# Patient Record
Sex: MALE | Race: WHITE | NOT HISPANIC OR LATINO | Employment: OTHER | URBAN - METROPOLITAN AREA
[De-identification: names, ages, dates, MRNs, and addresses within clinical notes are randomized per-mention and may not be internally consistent; named-entity substitution may affect disease eponyms.]

---

## 2017-01-05 ENCOUNTER — ALLSCRIPTS OFFICE VISIT (OUTPATIENT)
Dept: OTHER | Facility: OTHER | Age: 77
End: 2017-01-05

## 2017-01-31 ENCOUNTER — GENERIC CONVERSION - ENCOUNTER (OUTPATIENT)
Dept: OTHER | Facility: OTHER | Age: 77
End: 2017-01-31

## 2017-02-02 ENCOUNTER — GENERIC CONVERSION - ENCOUNTER (OUTPATIENT)
Dept: OTHER | Facility: OTHER | Age: 77
End: 2017-02-02

## 2017-02-09 ENCOUNTER — GENERIC CONVERSION - ENCOUNTER (OUTPATIENT)
Dept: OTHER | Facility: OTHER | Age: 77
End: 2017-02-09

## 2017-02-16 ENCOUNTER — GENERIC CONVERSION - ENCOUNTER (OUTPATIENT)
Dept: OTHER | Facility: OTHER | Age: 77
End: 2017-02-16

## 2017-03-03 ENCOUNTER — GENERIC CONVERSION - ENCOUNTER (OUTPATIENT)
Dept: OTHER | Facility: OTHER | Age: 77
End: 2017-03-03

## 2017-03-15 ENCOUNTER — GENERIC CONVERSION - ENCOUNTER (OUTPATIENT)
Dept: OTHER | Facility: OTHER | Age: 77
End: 2017-03-15

## 2017-04-06 ENCOUNTER — GENERIC CONVERSION - ENCOUNTER (OUTPATIENT)
Dept: OTHER | Facility: OTHER | Age: 77
End: 2017-04-06

## 2017-04-12 ENCOUNTER — GENERIC CONVERSION - ENCOUNTER (OUTPATIENT)
Dept: OTHER | Facility: OTHER | Age: 77
End: 2017-04-12

## 2017-04-18 ENCOUNTER — GENERIC CONVERSION - ENCOUNTER (OUTPATIENT)
Dept: OTHER | Facility: OTHER | Age: 77
End: 2017-04-18

## 2017-04-24 ENCOUNTER — GENERIC CONVERSION - ENCOUNTER (OUTPATIENT)
Dept: OTHER | Facility: OTHER | Age: 77
End: 2017-04-24

## 2017-04-25 ENCOUNTER — GENERIC CONVERSION - ENCOUNTER (OUTPATIENT)
Dept: OTHER | Facility: OTHER | Age: 77
End: 2017-04-25

## 2017-06-06 ENCOUNTER — TRANSCRIBE ORDERS (OUTPATIENT)
Dept: LAB | Facility: MEDICAL CENTER | Age: 77
End: 2017-06-06

## 2017-06-06 ENCOUNTER — APPOINTMENT (OUTPATIENT)
Dept: LAB | Facility: MEDICAL CENTER | Age: 77
End: 2017-06-06
Payer: MEDICARE

## 2017-06-06 ENCOUNTER — ALLSCRIPTS OFFICE VISIT (OUTPATIENT)
Dept: OTHER | Facility: OTHER | Age: 77
End: 2017-06-06

## 2017-06-06 DIAGNOSIS — E78.5 HYPERLIPIDEMIA: ICD-10-CM

## 2017-06-06 DIAGNOSIS — M48.07 SPINAL STENOSIS OF LUMBOSACRAL REGION: ICD-10-CM

## 2017-06-06 DIAGNOSIS — D64.9 ANEMIA: ICD-10-CM

## 2017-06-06 DIAGNOSIS — I10 ESSENTIAL (PRIMARY) HYPERTENSION: ICD-10-CM

## 2017-06-06 DIAGNOSIS — F32.9 MAJOR DEPRESSIVE DISORDER, SINGLE EPISODE: ICD-10-CM

## 2017-06-06 DIAGNOSIS — R41.3 OTHER AMNESIA: ICD-10-CM

## 2017-06-06 DIAGNOSIS — N39.0 URINARY TRACT INFECTION: ICD-10-CM

## 2017-06-06 LAB
ALBUMIN SERPL BCP-MCNC: 3.8 G/DL (ref 3.5–5)
ALP SERPL-CCNC: 62 U/L (ref 46–116)
ALT SERPL W P-5'-P-CCNC: 26 U/L (ref 12–78)
ANION GAP SERPL CALCULATED.3IONS-SCNC: 6 MMOL/L (ref 4–13)
AST SERPL W P-5'-P-CCNC: 14 U/L (ref 5–45)
BASOPHILS # BLD AUTO: 0.03 THOUSANDS/ΜL (ref 0–0.1)
BASOPHILS NFR BLD AUTO: 0 % (ref 0–1)
BILIRUB SERPL-MCNC: 0.57 MG/DL (ref 0.2–1)
BILIRUB UR QL STRIP: NEGATIVE
BUN SERPL-MCNC: 14 MG/DL (ref 5–25)
CALCIUM SERPL-MCNC: 8.5 MG/DL (ref 8.3–10.1)
CHLORIDE SERPL-SCNC: 103 MMOL/L (ref 100–108)
CHOLEST SERPL-MCNC: 178 MG/DL (ref 50–200)
CLARITY UR: CLEAR
CO2 SERPL-SCNC: 29 MMOL/L (ref 21–32)
COLOR UR: YELLOW
CREAT SERPL-MCNC: 0.72 MG/DL (ref 0.6–1.3)
EOSINOPHIL # BLD AUTO: 0.25 THOUSAND/ΜL (ref 0–0.61)
EOSINOPHIL NFR BLD AUTO: 2 % (ref 0–6)
ERYTHROCYTE [DISTWIDTH] IN BLOOD BY AUTOMATED COUNT: 13.1 % (ref 11.6–15.1)
GFR SERPL CREATININE-BSD FRML MDRD: >60 ML/MIN/1.73SQ M
GLUCOSE P FAST SERPL-MCNC: 80 MG/DL (ref 65–99)
GLUCOSE UR STRIP-MCNC: NEGATIVE MG/DL
HCT VFR BLD AUTO: 39.9 % (ref 36.5–49.3)
HDLC SERPL-MCNC: 59 MG/DL (ref 40–60)
HGB BLD-MCNC: 13.5 G/DL (ref 12–17)
HGB UR QL STRIP.AUTO: NEGATIVE
KETONES UR STRIP-MCNC: NEGATIVE MG/DL
LDLC SERPL CALC-MCNC: 94 MG/DL (ref 0–100)
LEUKOCYTE ESTERASE UR QL STRIP: NEGATIVE
LYMPHOCYTES # BLD AUTO: 3.83 THOUSANDS/ΜL (ref 0.6–4.47)
LYMPHOCYTES NFR BLD AUTO: 32 % (ref 14–44)
MCH RBC QN AUTO: 31.3 PG (ref 26.8–34.3)
MCHC RBC AUTO-ENTMCNC: 33.8 G/DL (ref 31.4–37.4)
MCV RBC AUTO: 93 FL (ref 82–98)
MONOCYTES # BLD AUTO: 0.81 THOUSAND/ΜL (ref 0.17–1.22)
MONOCYTES NFR BLD AUTO: 7 % (ref 4–12)
NEUTROPHILS # BLD AUTO: 6.84 THOUSANDS/ΜL (ref 1.85–7.62)
NEUTS SEG NFR BLD AUTO: 59 % (ref 43–75)
NITRITE UR QL STRIP: NEGATIVE
NRBC BLD AUTO-RTO: 0 /100 WBCS
PH UR STRIP.AUTO: 6 [PH] (ref 4.5–8)
PLATELET # BLD AUTO: 368 THOUSANDS/UL (ref 149–390)
PMV BLD AUTO: 11.2 FL (ref 8.9–12.7)
POTASSIUM SERPL-SCNC: 4.6 MMOL/L (ref 3.5–5.3)
PROT SERPL-MCNC: 7.1 G/DL (ref 6.4–8.2)
PROT UR STRIP-MCNC: NEGATIVE MG/DL
RBC # BLD AUTO: 4.31 MILLION/UL (ref 3.88–5.62)
SODIUM SERPL-SCNC: 138 MMOL/L (ref 136–145)
SP GR UR STRIP.AUTO: 1.02 (ref 1–1.03)
TRIGL SERPL-MCNC: 125 MG/DL
TSH SERPL DL<=0.05 MIU/L-ACNC: 1.65 UIU/ML (ref 0.36–3.74)
UROBILINOGEN UR QL STRIP.AUTO: 0.2 E.U./DL
WBC # BLD AUTO: 11.81 THOUSAND/UL (ref 4.31–10.16)

## 2017-06-06 PROCEDURE — 84443 ASSAY THYROID STIM HORMONE: CPT

## 2017-06-06 PROCEDURE — 80053 COMPREHEN METABOLIC PANEL: CPT

## 2017-06-06 PROCEDURE — 80061 LIPID PANEL: CPT

## 2017-06-06 PROCEDURE — 81003 URINALYSIS AUTO W/O SCOPE: CPT

## 2017-06-06 PROCEDURE — 85025 COMPLETE CBC W/AUTO DIFF WBC: CPT

## 2017-06-06 PROCEDURE — 36415 COLL VENOUS BLD VENIPUNCTURE: CPT

## 2017-06-07 ENCOUNTER — GENERIC CONVERSION - ENCOUNTER (OUTPATIENT)
Dept: OTHER | Facility: OTHER | Age: 77
End: 2017-06-07

## 2017-07-11 ENCOUNTER — ALLSCRIPTS OFFICE VISIT (OUTPATIENT)
Dept: OTHER | Facility: OTHER | Age: 77
End: 2017-07-11

## 2018-01-12 NOTE — MISCELLANEOUS
Assessment    1  Former smoker (V15 82) (A37 763)   2  Shortness of breath (786 05) (R06 02)    Plan   Benign prostatic hypertrophy without urinary obstruction, Dysuria    · Levofloxacin 500 MG Oral Tablet   Rx By: Sharron Sharp; Dispense: 10 Days ; #:10 Tablet; Refill: 0; For: Benign prostatic hypertrophy without urinary obstruction, Dysuria; YENIFER = N; Sent To: [facility] 8095  Coronary artery disease    · Losartan Potassium 50 MG Oral Tablet   Rx By: Sharron Sharp; Dispense: 0 Days ; #:180 Tablet; Refill: 3; For: Coronary artery disease; YENIFER = N; Record  Shortness of breath    · Symbicort 160-4 5 MCG/ACT Inhalation Aerosol; INHALE 2 PUFFS TWICE DAILY  RINSE MOUTH AFTER USE   Rx By: Sharron Sharp; Dispense: 30 Days ; #:1 X 10 2 GM Inhaler; Refill: 5; For: Shortness of breath; YENIFER = N; Record    Follow-up visit in 3 months Evaluation and Treatment  Follow-up  Status: Hold For - Scheduling  Requested for: 81ZSD4227  Ordered; For: Shortness of breath;  Ordered By: Sharron Sharp  Performed:   Due: 94IRT1292     Discussion/Summary  Discussion Summary:   Sample of [facility] given  F/U with pulmonology and hematology  Follow-up here in 1-2 months or when necessary  Medication SE Review and Pt Understands Tx: Possible side effects of new medications were reviewed with the patient/guardian today  The treatment plan was reviewed with the patient/guardian  The patient/guardian understands and agrees with the treatment plan      Chief Complaint  Chief Complaint Free Text Note Form: DOUGLAS      History of Present Illness  TCM Communication [facility]: The patient is being contacted for follow-up after hospitalization and Munir Cerda called and scheduled patient for AKIRA ODOM appt on 5/23/16 at 1pm  Patient has no pending appts scheduled  Hospital records were reviewed  He was hospitalized at [address] [place] [address]  The date of admission: 05/15/2016, date of discharge: 05/16/2016  Diagnosis: SOB  He was discharged to home  Medications were not reviewed today  He scheduled a follow up appointment  Follow-up appointments with other specialists: Laurita Summers/Pulmonology and Lazarus Slim Banning/Cardiology both to be scheduled appt ASAP for visit in 3 days  The patient is currently asymptomatic  Counseling was provided to the patient  Communication performed and completed by Alecia Engel   HPI: Patient was admitted for shortness of breath  Patient was discharged on prednisone and feeling better  Patient has been on inhalers like Symbicort, but has not used them in a while  Review of Systems  Complete-Male:   Constitutional: No fever or chills, feels well, no tiredness, no recent weight gain or weight loss  Cardiovascular: No complaints of slow heart rate, no fast heart rate, no chest pain, no palpitations, no leg claudication, no lower extremity  Respiratory: as noted in HPI  Gastrointestinal: No complaints of abdominal pain, no constipation, no nausea or vomiting, no diarrhea or bloody stools  Genitourinary: No complaints of dysuria, no incontinence, no hesitancy, no nocturia, no genital lesion, no testicular pain  Active Problems    1  Abnormal fasting glucose (790 29) (R73 09)   2  Acute sinusitis (461 9) (J01 90)   3  Anemia (285 9) (D64 9)   4  Arthritis (716 90) (M19 90)   5  Benign prostatic hypertrophy without urinary obstruction (600 00) (N40 0)   6  Blurry vision (368 8) (H53 8)   7  Coronary artery disease (414 00) (I25 10)   8  Depression (311) (F32 9)   9  Dyspepsia (536 8) (K30)   10  Dysuria (788 1) (R30 0)   11  Encounter for prostate cancer screening (V76 44) (Z12 5)   12  Fatigue (780 79) (R53 83)   13  Gastric ulcer (531 90) (K25 9)   14  Hyperlipidemia (272 4) (E78 5)   15  Hypertension (401 9) (I10)   16  Insomnia (780 52) (G47 00)   17  Muscle fatigue (729 89) (M62 89)   18  Need for prophylactic vaccination and inoculation against influenza (V04 81) (Z23)   19   Normal pressure hydrocephalus (331 5) (G91 2)   20  Open Wound Of The Foot (892 0)   21  Pain in joint of right shoulder region (719 41) (M25 511)   22  Peripheral neuropathy (356 9) (G62 9)   23  Sciatica (724 3) (M54 30)   24  Screening for neurological condition (V80 09) (Z13 89)   25  Urinary tract infection (599 0) (N39 0)    Past Medical History    1  Acute upper respiratory infection (465 9) (J06 9)   2  Coronary artery disease (414 00) (I25 10)   3  Hernia (553 9) (K46 9)    Surgical History    1  History of Appendectomy   2  History of Complete Colonoscopy   3  History of Diagnostic Esophagogastroduodenoscopy   4  History of Knee Replacement   5  History of Needle Biopsy Of Prostate   6  History of Tonsillectomy  Surgical History Reviewed: The surgical history was reviewed and updated today  Family History  Sister    1  Family history of Malignant neoplasm of breast, unspecified laterality  Brother    2  Family history of leukemia (V16 6) (Z80 6)  Family History    3  Family history of Diabetes Mellitus (250 00)   4  Family history of hypertension (V17 49) (Z82 49)  Family History Reviewed: The family history was reviewed and updated today  Social History     · Being A Social Drinker  Social History Reviewed: The social history was reviewed and updated today  The social history was reviewed and is unchanged  Former smoker (V15 82) (C57 838)             Current Meds   1  Aspirin 325 MG Oral Tablet; ONE TAB DAILY Recorded   2  Cialis 5 MG Oral Tablet; TAKE 1 TABLET Daily for BPH; Therapy: 65Dba7175 to (Evaluate:21Jun2014); Last Rx:00Gax5375 Ordered   3  Diclofenac Sodium  MG Oral Tablet Extended Release 24 Hour; TAKE 1 TABLET   BY MOUTH TWICE A DAY AS NEEDED FOR PAIN;   Therapy: 94ACU9804 to (Evaluate:22Jan2015)  Requested for: 25PXQ5880; Last   Rx:27Jan2014 Ordered   4  Fish Oil 1000 MG Oral Capsule; TAKE 1 CAPSULE DAILY; Therapy: 41PCH2435 to Recorded   5  Levofloxacin 500 MG Oral Tablet;  Take 1 tablet daily; Therapy: 96TDA0394 to (Tim Smith)  Requested for: 87WPU5999; Last   Rx:18Mar2016 Ordered   6  Losartan Potassium 50 MG Oral Tablet; TAKE 1 TABLET TWICE DAILY; Therapy: 03XIV8953 to (Evaluate:24Nov2014); Last Rx:13Mxl1195 Ordered   7  Nitrostat 0 4 MG Sublingual Tablet Sublingual; PLACE 1 TABLET UNDER THE TONGUE   EVERY 5 MINUTES FOR UP TO 3 DOSES AS NEEDED FOR CHEST PAIN  CALL   911 IF PAIN PERSISTS; Therapy: 39EXH7357 to (Florencia Shea)  Requested for: 76VVV9483; Last   Rx:09Fbx0643 Ordered   8  Oxycodone-Acetaminophen 5-325 MG Oral Tablet; TAKE 1 TABLET 4 times daily PRN   pain; Therapy: 87KJO8425 to (Evaluate:74Ipn8236)  Requested for: 71KOB2564; Last   Rx:76Buu1291 Ordered   9  Tamsulosin HCl - 0 4 MG Oral Capsule; TAKE ONE CAPSULE AT BEDTIME; Therapy: 30PXJ0883 to (Evaluate:70Ipx6234)  Requested for: 82MVF9545; Last   Rx:18Mar2016 Ordered   10  Zolpidem Tartrate 10 MG Oral Tablet; Take one (1) tablet(s) by mouth ATBEDTIME as    needed for insomnia; Therapy: 88AAF7554 to (Evaluate:13Apr2015)  Requested for: 10NJV6688; Last    Rx:48Ior6048 Ordered  Medication List Reviewed: The medication list was reviewed and updated today  Allergies    1  No Known Drug Allergies    Vitals  Signs [Data Includes: Current Encounter]   Recorded: 23DMA0925 30:19CE   Systolic: 242  Diastolic: 64  Height: 6 ft 1 in  Weight: 199 lb 8 0 oz  BMI Calculated: 26 32  BSA Calculated: 2 14    Physical Exam    Constitutional   General appearance: No acute distress, well appearing and well nourished  Pulmonary   Respiratory effort: No increased work of breathing or signs of respiratory distress  Auscultation of lungs: Clear to auscultation, equal breath sounds bilaterally, no wheezes, no rales, no rhonci  Cardiovascular   Auscultation of heart: Normal rate and rhythm, normal S1 and S2, without murmurs      Examination of extremities for edema and/or varicosities: Normal     Psychiatric Orientation to person, place and time: Normal     Mood and affect: Normal          Signatures   Electronically signed by : Trent Dong, ; May 18 2016  1:35PM EST                       (Author)    Electronically signed by : Jah Ellis DO; May 23 2016  4:44PM EST                       (Author)

## 2018-01-14 VITALS
HEIGHT: 73 IN | BODY MASS INDEX: 24.78 KG/M2 | WEIGHT: 187 LBS | SYSTOLIC BLOOD PRESSURE: 130 MMHG | DIASTOLIC BLOOD PRESSURE: 70 MMHG

## 2018-01-14 VITALS
SYSTOLIC BLOOD PRESSURE: 112 MMHG | HEIGHT: 73 IN | BODY MASS INDEX: 25.2 KG/M2 | DIASTOLIC BLOOD PRESSURE: 70 MMHG | WEIGHT: 190.13 LBS

## 2018-01-14 VITALS
WEIGHT: 201.5 LBS | BODY MASS INDEX: 26.71 KG/M2 | DIASTOLIC BLOOD PRESSURE: 78 MMHG | SYSTOLIC BLOOD PRESSURE: 122 MMHG | HEIGHT: 73 IN

## 2018-01-14 NOTE — MISCELLANEOUS
Chief Complaint  Chief Complaint Free Text Note Form: Patient was in no show for his DOUGLAS appointment      History of Present Illness  TCM Communication [facility]: The patient is being contacted for follow-up after hospitalization and Nurse at [facility] called to schedule DOUGLAS appt on 10/17/16 at 1:15 pm  He was hospitalized at [facility]  The date of admission: 10/11/16, date of discharge: 10/12/16  Diagnosis: Atrial Fibrillation  He was discharged to home  Medications were not reviewed today  He scheduled a follow up appointment  Eryn Regalado spoke with nurse Counseling was provided to  Nurse from [facility]  Communication performed and completed by Valerie Pina      Active Problems    1  Abnormal fasting glucose (790 29) (R73 01)   2  Acute sinusitis (461 9) (J01 90)   3  Anemia (285 9) (D64 9)   4  Arthritis (716 90) (M19 90)   5  Benign prostatic hypertrophy without urinary obstruction (600 00) (N40 0)   6  Blurry vision (368 8) (H53 8)   7  Coronary artery disease (414 00) (I25 10)   8  Depression (311) (F32 9)   9  Dyspepsia (536 8) (K30)   10  Dysuria (788 1) (R30 0)   11  Encounter for prostate cancer screening (V76 44) (Z12 5)   12  Fatigue (780 79) (R53 83)   13  Gastric ulcer (531 90) (K25 9)   14  Hyperlipidemia (272 4) (E78 5)   15  Hypertension (401 9) (I10)   16  Insomnia (780 52) (G47 00)   17  Muscle fatigue (729 89) (M62 89)   18  Need for prophylactic vaccination and inoculation against influenza (V04 81) (Z23)   19  Normal pressure hydrocephalus (331 5) (G91 2)   20  Open Wound Of The Foot (892 0)   21  Pain in joint of right shoulder region (719 41) (M25 511)   22  Peripheral neuropathy (356 9) (G62 9)   23  Sciatica (724 3) (M54 30)   24  Screening for neurological condition (V80 09) (Z13 89)   25  Shortness of breath (786 05) (R06 02)   26  Urinary tract infection (599 0) (N39 0)    Past Medical History    1  Acute upper respiratory infection (465 9) (J06 9)   2  Coronary artery disease (414 00) (I25 10)   3  Hernia (553 9) (K46 9)    Surgical History    1  History of Appendectomy   2  History of Complete Colonoscopy   3  History of Diagnostic Esophagogastroduodenoscopy   4  History of Knee Replacement   5  History of Needle Biopsy Of Prostate   6  History of Tonsillectomy    Family History  Sister    1  Family history of Malignant neoplasm of breast, unspecified laterality  Brother    2  Family history of leukemia (V16 6) (Z80 6)  Family History    3  Family history of Diabetes Mellitus (250 00)   4  Family history of hypertension (V17 49) (Z82 49)    Social History    · Being A Social Drinker   · Former smoker (E83 36) (K03 795)    Current Meds   1  Aspirin 325 MG Oral Tablet; ONE TAB DAILY Recorded   2  Cialis 5 MG Oral Tablet; TAKE 1 TABLET Daily for BPH; Therapy: 62Pwk0458 to (Evaluate:21Jun2014); Last Rx:17Ixe4163 Ordered   3  Diclofenac Sodium  MG Oral Tablet Extended Release 24 Hour; TAKE 1 TABLET   BY MOUTH TWICE A DAY AS NEEDED FOR PAIN;   Therapy: 23ZSI4382 to (Evaluate:22Jan2015)  Requested for: 20VIG2606; Last   Rx:27Jan2014 Ordered   4  Fish Oil 1000 MG Oral Capsule; TAKE 1 CAPSULE DAILY; Therapy: 85DAQ1146 to Recorded   5  Nitrostat 0 4 MG Sublingual Tablet Sublingual; PLACE 1 TABLET UNDER THE TONGUE   EVERY 5 MINUTES FOR UP TO 3 DOSES AS NEEDED FOR CHEST PAIN  CALL   911 IF PAIN PERSISTS; Therapy: 19RAH0223 to ([phone])  Requested for: 80NRV8996; Last   Rx:40Lhh9934 Ordered   6  Oxycodone-Acetaminophen 5-325 MG Oral Tablet; TAKE 1 TABLET 4 times daily PRN   pain; Therapy: 01JOF8734 to (Evaluate:13Oct2016)  Requested for: 93JEV9776; Last   Rx:66Izq5521 Ordered   7  Symbicort 160-4 5 MCG/ACT Inhalation Aerosol; INHALE 2 PUFFS TWICE DAILY  RINSE   MOUTH AFTER USE; Therapy: 29RNA5338 to (Evaluate:19Nov2016); Last Rx:49Wns8689 Ordered   8  Tamsulosin HCl - 0 4 MG Oral Capsule; TAKE ONE CAPSULE AT BEDTIME;    Therapy: 62ITI0665 to (Evaluate:52Xua6693)  Requested for: 11TAC3020; Last   Rx:18Mar2016 Ordered   9  Zolpidem Tartrate 10 MG Oral Tablet; Take one (1) tablet(s) by mouth ATBEDTIME as   needed for insomnia; Therapy: 25PQI7651 to (Evaluate:13Apr2015)  Requested for: 25JLR3804; Last   Rx:70Sxk2378 Ordered    Allergies    1   No Known Drug Allergies    Signatures   Electronically signed by : Holland Barton, ; Oct 14 2016  4:20PM EST                       (Author)    Electronically signed by : Enzo Bailey DO; Oct 18 2016  3:49PM EST                       (Author)

## 2018-01-17 NOTE — RESULT NOTES
Verified Results  (1) TSH 01DCU8975 10:34AM Michael Days Order Number: [id number]     Test Name Result Flag Reference   TSH 1 650 uIU/mL  0 358-3 740   Patients undergoing fluorescein dye angiography may retain small amounts of fluorescein in the body for 48-72 hours post procedure  Samples containing fluorescein can produce falsely depressed TSH values  If the patient had this procedure,a specimen should be resubmitted post fluorescein clearance  (1) URINALYSIS w URINE C/S REFLEX (will reflex a microscopy if leukocytes, occult blood, or nitrites are not within normal limits) 99FBF4122 10:34AM Rod Ferrariksenia    Order Number: [id number]     Test Name Result Flag Reference   COLOR Yellow     CLARITY Clear     PH UA 6 0  4 5-8 0   LEUKOCYTE ESTERASE UA Negative  Negative   NITRITE UA Negative  Negative   PROTEIN UA Negative mg/dl  Negative   GLUCOSE UA Negative mg/dl  Negative   KETONES UA Negative mg/dl  Negative   UROBILINOGEN UA 0 2 E U /dl  0 2, 1 0 E U /dl   BILIRUBIN UA Negative  Negative   BLOOD UA Negative  Negative   SPECIFIC GRAVITY UA 1 017  1 003-1 030     (1) CBC/PLT/DIFF 68IUR8720 10:34AM Pamelapromise Elizabeth    Order Number: [id number]     Test Name Result Flag Reference   WBC COUNT 11 81 Thousand/uL H 4 31-10 16   RBC COUNT 4 31 Million/uL  3 88-5 62   HEMOGLOBIN 13 5 g/dL  12 0-17 0   HEMATOCRIT 39 9 %  36 5-49 3   MCV 93 fL  82-98   MCH 31 3 pg  26 8-34 3   MCHC 33 8 g/dL  31 4-37 4   RDW 13 1 %  11 6-15 1   MPV 11 2 fL  8 9-12 7   PLATELET COUNT 673 Thousands/uL  149-390   nRBC AUTOMATED 0 /100 WBCs     NEUTROPHILS RELATIVE PERCENT 59 %  43-75   LYMPHOCYTES RELATIVE PERCENT 32 %  14-44   MONOCYTES RELATIVE PERCENT 7 %  4-12   EOSINOPHILS RELATIVE PERCENT 2 %  0-6   BASOPHILS RELATIVE PERCENT 0 %  0-1   NEUTROPHILS ABSOLUTE COUNT 6 84 Thousands/? ??L  1 85-7 62   LYMPHOCYTES ABSOLUTE COUNT 3 83 Thousands/? ??L  0 60-4 47   MONOCYTES ABSOLUTE COUNT 0 81 Thousand/? ??L  0 17-1 22   EOSINOPHILS ABSOLUTE COUNT 0 25 Thousand/? ??L  0 00-0 61   BASOPHILS ABSOLUTE COUNT 0 03 Thousands/? ??L  0 00-0 10     (1) COMPREHENSIVE METABOLIC PANEL 10YNW7254 45:56EN Marilu Lopez   TW Order Number: [id number]     Test Name Result Flag Reference   SODIUM 138 mmol/L  136-145   POTASSIUM 4 6 mmol/L  3 5-5 3   CHLORIDE 103 mmol/L  100-108   CARBON DIOXIDE 29 mmol/L  21-32   ANION GAP (CALC) 6 mmol/L  4-13   BLOOD UREA NITROGEN 14 mg/dL  5-25   CREATININE 0 72 mg/dL  0 60-1 30   Standardized to IDMS reference method   CALCIUM 8 5 mg/dL  8 3-10 1   BILI, TOTAL 0 57 mg/dL  0 20-1 00   ALK PHOSPHATAS 62 U/L     ALT (SGPT) 26 U/L  12-78   AST(SGOT) 14 U/L  5-45   ALBUMIN 3 8 g/dL  3 5-5 0   TOTAL PROTEIN 7 1 g/dL  6 4-8 2   eGFR Non-African American      >60 0 ml/min/1 73sq m   [facility] [employer] Disease Education Program recommendations are as follows:  GFR calculation is accurate only with a steady state creatinine  Chronic Kidney disease less than 60 ml/min/1 73 sq  meters  Kidney failure less than 15 ml/min/1 73 sq  meters     GLUCOSE FASTING 80 mg/dL  65-99     (1) LIPID PANEL FASTING W DIRECT LDL REFLEX 05FQC3960 10:34AM Ioana Jackman Order Number: [id number]     Test Name Result Flag Reference   CHOLESTEROL 178 mg/dL     LDL CHOLESTEROL CALCULATED 94 mg/dL  0-100   Triglyceride:         Normal              <150 mg/dl       Borderline High    150-199 mg/dl       High               200-499 mg/dl       Very High          >499 mg/dl  Cholesterol:         Desirable        <200 mg/dl      Borderline High  200-239 mg/dl      High             >239 mg/dl  HDL Cholesterol:        High    >59 mg/dL      Low     <41 mg/dL  LDL Cholesterol:        Optimal          <100 mg/dl        Near Optimal     100-129 mg/dl        Above Optimal          Borderline High   130-159 mg/dl          High              160-189 mg/dl          Very High        >189 mg/dl  LDL CALCULATED:    This screening LDL is a calculated result  It does not have the accuracy of the Direct Measured LDL in the monitoring of patients with hyperlipidemia and/or statin therapy  Direct Measure LDL (HEU151) must be ordered separately in these patients  TRIGLYCERIDES 125 mg/dL  <=150   Specimen collection should occur prior to N-Acetylcysteine or Metamizole administration due to the potential for falsely depressed results  HDL,DIRECT 59 mg/dL  40-60   Specimen collection should occur prior to Metamizole administration due to the potential for falsely depressed results  Plan  Anemia    · (1) CBC/PLT/DIFF; Status:Active;  Requested QZA:[id number];

## 2018-03-05 ENCOUNTER — TELEPHONE (OUTPATIENT)
Dept: FAMILY MEDICINE CLINIC | Facility: CLINIC | Age: 78
End: 2018-03-05

## 2018-12-13 DIAGNOSIS — F32.A DEPRESSION, UNSPECIFIED DEPRESSION TYPE: Primary | ICD-10-CM

## 2018-12-13 DIAGNOSIS — M48.07 LUMBOSACRAL SPINAL STENOSIS: ICD-10-CM

## 2018-12-13 RX ORDER — SERTRALINE HYDROCHLORIDE 100 MG/1
TABLET, FILM COATED ORAL
Qty: 90 TABLET | Refills: 3 | Status: SHIPPED | OUTPATIENT
Start: 2018-12-13 | End: 2020-02-10 | Stop reason: SDUPTHER

## 2019-02-11 ENCOUNTER — TRANSITIONAL CARE MANAGEMENT (OUTPATIENT)
Dept: FAMILY MEDICINE CLINIC | Facility: CLINIC | Age: 79
End: 2019-02-11

## 2019-02-13 ENCOUNTER — TELEPHONE (OUTPATIENT)
Dept: FAMILY MEDICINE CLINIC | Facility: CLINIC | Age: 79
End: 2019-02-13

## 2019-02-13 NOTE — TELEPHONE ENCOUNTER
PT WA D/C FROM HOSPITAL, THEY ARE TO GO IN FOR PT/OT  VERIFIED YOU ARE THE PCP, THEY WILL SEND ORDER FOR YOU TO SIGN  FLORINDA CALLED BACK FROM [facility], SHE WAS UNABLE TO MAKE IT TO THERE HOME DUE TO THE WEATHER CONDITIONS ON THERE ROAD

## 2019-07-01 ENCOUNTER — TRANSITIONAL CARE MANAGEMENT (OUTPATIENT)
Dept: FAMILY MEDICINE CLINIC | Facility: CLINIC | Age: 79
End: 2019-07-01

## 2019-07-03 ENCOUNTER — TELEPHONE (OUTPATIENT)
Dept: FAMILY MEDICINE CLINIC | Facility: CLINIC | Age: 79
End: 2019-07-03

## 2019-08-12 ENCOUNTER — TELEPHONE (OUTPATIENT)
Dept: FAMILY MEDICINE CLINIC | Facility: CLINIC | Age: 79
End: 2019-08-12

## 2019-08-12 NOTE — TELEPHONE ENCOUNTER
The only blood thinner I have him on in our chart is aspirin    He may stop at 7 days prior to surgery, and resume as soon as the surgeon says he can resume it

## 2019-10-07 ENCOUNTER — TELEPHONE (OUTPATIENT)
Dept: FAMILY MEDICINE CLINIC | Facility: CLINIC | Age: 79
End: 2019-10-07

## 2019-10-07 NOTE — TELEPHONE ENCOUNTER
PT BEING RELEASED FROM [facility] ON THE 10TH WITH SKILLED, OT, PT  WOULD YOU BE WILLING TO SIGN ORDERS?

## 2019-10-11 ENCOUNTER — TRANSITIONAL CARE MANAGEMENT (OUTPATIENT)
Dept: FAMILY MEDICINE CLINIC | Facility: CLINIC | Age: 79
End: 2019-10-11

## 2019-10-11 ENCOUNTER — TELEPHONE (OUTPATIENT)
Dept: FAMILY MEDICINE CLINIC | Facility: CLINIC | Age: 79
End: 2019-10-11

## 2019-10-11 NOTE — TELEPHONE ENCOUNTER
Besides the metoprolol, is he on any other blood pressure medications?   What dose of metoprolol is he on?

## 2019-10-11 NOTE — TELEPHONE ENCOUNTER
WENT IN TO START CARE TODAY  PT BP WAS HIGH, 160/100 MANUALLY SEATED  STANDING IT /72  SHE TOOK IT ELECTRONICALLY AND IT /95  HE DID TAKE HIS ASPIRIN AND METOPROLOL  COMPLAINING OF BAD HEADACHE, PAIN LEVEL OF 7/10  WHEN HE WAS IN THE NURSING HOME IT WAS RUNNING LOW 70/40   WHAT TO DO?

## 2019-10-11 NOTE — TELEPHONE ENCOUNTER
THE METOPROLOL IS 12 5 MG BID, HE IS ALSO ON RANEXA 500 MG BID  WHEN HE WAS IN THE NURSING HOME HE WAS GIVEN FLORINEX  1 MG FOR A SYSTOLIC READING BELOW 697  HE DOES NOT HAVE THAT NOW

## 2019-10-11 NOTE — TELEPHONE ENCOUNTER
He may take another metoprolol, taking it 3 times a day if his blood pressure remains high    Continue to monitor and call us with results

## 2019-10-15 ENCOUNTER — TELEPHONE (OUTPATIENT)
Dept: FAMILY MEDICINE CLINIC | Facility: CLINIC | Age: 79
End: 2019-10-15

## 2019-10-15 DIAGNOSIS — R06.00 DYSPNEA ON EXERTION: Primary | ICD-10-CM

## 2019-10-15 DIAGNOSIS — R06.2 WHEEZING: ICD-10-CM

## 2019-10-15 PROBLEM — A04.72 C. DIFFICILE COLITIS: Status: ACTIVE | Noted: 2019-10-07

## 2019-10-15 NOTE — TELEPHONE ENCOUNTER
PT [address] [facility], SHE WENT IN TO START CARE AGAIN  PT IS HAVING SOME WHEEZING IN BILATERAL LUNG BASE, DYSPNEA WITH EXERTION  WOULD LIKE RX FOR A NEBULIZER FAXED TO IN HOME OXYGEN [phone]

## 2019-10-17 ENCOUNTER — OFFICE VISIT (OUTPATIENT)
Dept: FAMILY MEDICINE CLINIC | Facility: CLINIC | Age: 79
End: 2019-10-17
Payer: MEDICARE

## 2019-10-17 VITALS
BODY MASS INDEX: 23.3 KG/M2 | HEIGHT: 71 IN | DIASTOLIC BLOOD PRESSURE: 80 MMHG | WEIGHT: 166.4 LBS | SYSTOLIC BLOOD PRESSURE: 128 MMHG

## 2019-10-17 DIAGNOSIS — I10 ESSENTIAL HYPERTENSION: ICD-10-CM

## 2019-10-17 DIAGNOSIS — A04.72 C. DIFFICILE COLITIS: Primary | ICD-10-CM

## 2019-10-17 DIAGNOSIS — H91.93 DECREASED HEARING OF BOTH EARS: ICD-10-CM

## 2019-10-17 DIAGNOSIS — Z23 ENCOUNTER FOR IMMUNIZATION: ICD-10-CM

## 2019-10-17 PROCEDURE — 90732 PPSV23 VACC 2 YRS+ SUBQ/IM: CPT | Performed by: FAMILY MEDICINE

## 2019-10-17 PROCEDURE — G0009 ADMIN PNEUMOCOCCAL VACCINE: HCPCS | Performed by: FAMILY MEDICINE

## 2019-10-17 PROCEDURE — 99214 OFFICE O/P EST MOD 30 MIN: CPT | Performed by: FAMILY MEDICINE

## 2019-10-17 RX ORDER — PRAMIPEXOLE DIHYDROCHLORIDE 0.12 MG/1
0.12 TABLET ORAL 3 TIMES DAILY
COMMUNITY
End: 2020-02-10

## 2019-10-17 RX ORDER — ASPIRIN 81 MG/1
81 TABLET ORAL DAILY
COMMUNITY

## 2019-10-17 RX ORDER — RANOLAZINE 500 MG/1
500 TABLET, EXTENDED RELEASE ORAL 2 TIMES DAILY
COMMUNITY
End: 2020-02-10

## 2019-10-17 RX ORDER — FLUDROCORTISONE ACETATE 0.1 MG/1
0.1 TABLET ORAL DAILY
COMMUNITY
End: 2020-02-10 | Stop reason: SDUPTHER

## 2019-10-17 RX ORDER — NITROGLYCERIN 0.4 MG/1
0.4 TABLET SUBLINGUAL
COMMUNITY

## 2019-10-17 RX ORDER — ATORVASTATIN CALCIUM 40 MG/1
40 TABLET, FILM COATED ORAL DAILY
COMMUNITY
End: 2020-02-10

## 2019-10-17 RX ORDER — VANCOMYCIN HYDROCHLORIDE 125 MG/1
125 CAPSULE ORAL 4 TIMES DAILY
COMMUNITY
End: 2020-02-10

## 2019-10-17 RX ORDER — SODIUM CHLORIDE 1000 MG
1 TABLET, SOLUBLE MISCELLANEOUS 3 TIMES DAILY
COMMUNITY
End: 2020-02-10

## 2019-10-17 NOTE — PROGRESS NOTES
Transition of Care  Follow-up After Hospitalization    Colletta Notch 67 y o  male   Date:  10/17/2019    TCM Call (since 9/16/2019)     Date and time call was made  10/11/2019 10:21 AM    Patient was hospitialized at  [facility] (comment)    [address] [place] [address] unknown admission date and transferred to [facility] in [place] on 9/5/19    Date of Admission  09/05/19    Date of discharge  10/10/19    Diagnosis  Fecal transplant    Disposition  Home health services; Home      TCM Call (since 9/16/2019)     Scheduled for follow up? Yes <img src='C:FILES (X86)    Are you recieving home care services  Yes <img src='C:FILES (X86)    Counseling  -- <img src='C:FILES [facility])        Hospital records were reviewed  Medications upon discharge reviewed/updated  Discharge Disposition:    Follow up visits with other specialists:       Assessment and Plan:  Patient will continue the vancomycin for now until he hears from the GI doctor whether they want to do another fecal transplant for him  Pneumonia 23 given today  Patient already had his flu shot this year  We will see him back in the springtime or p r n  Tristan Caceres was seen today for follow-up  Diagnoses and all orders for this visit:    C  difficile colitis    Essential hypertension    Decreased hearing of both ears    Encounter for immunization  -     PNEUMOCOCCAL POLYSACCHARIDE VACCINE 23-VALENT =>3YO SQ IM            HPI:  Patient here today for DOUGLAS  Patient denies any chest pain or shortness of breath  Patient was admitted for hypertension and C diff  Patient now on vancomycin protocol  Patient has been treated for C diff it least 3 times beforehand  Even had a fecal transplant  Unfortunately, patient had to go back on antibiotics a month or so ago for an infected elbow, which gave him the C diff again  Patient is having a side effect from the vancomycin with decreased hearing  He denies any fever chills        ROS: Review of Systems Constitutional: Negative  HENT: Positive for hearing loss  Respiratory: Negative  Cardiovascular: Negative  Gastrointestinal:        As per HPI   Genitourinary: Negative  No past medical history on file      Past Surgical History:   Procedure Laterality Date    CARDIAC SURGERY      COLONOSCOPY WITH FECAL TRANSPLANT      SHUNT TAP  08/25/2019       Social History     Socioeconomic History    Marital status: /Civil Union     Spouse name: None    Number of children: None    Years of education: None    Highest education level: None   Occupational History    None   Social Needs    Financial resource strain: None    Food insecurity:     Worry: None     Inability: None    Transportation needs:     Medical: None     Non-medical: None   Tobacco Use    Smoking status: Never Smoker    Smokeless tobacco: Never Used   Substance and Sexual Activity    Alcohol use: Not Currently    Drug use: Never    Sexual activity: Not Currently   Lifestyle    Physical activity:     Days per week: None     Minutes per session: None    Stress: None   Relationships    Social connections:     Talks on phone: None     Gets together: None     Attends Muslim service: None     Active member of club or organization: None     Attends meetings of clubs or organizations: None     Relationship status: None    Intimate partner violence:     Fear of current or ex partner: None     Emotionally abused: None     Physically abused: None     Forced sexual activity: None   Other Topics Concern    None   Social History Narrative    None       Family History   Family history unknown: Yes       No Known Allergies      Current Outpatient Medications:     aspirin (ECOTRIN LOW STRENGTH) 81 mg EC tablet, Take 81 mg by mouth daily, Disp: , Rfl:     atorvastatin (LIPITOR) 40 mg tablet, Take 40 mg by mouth daily, Disp: , Rfl:     carbidopa-levodopa (SINEMET)  mg per tablet, Take 1 tablet by mouth 2 (two) times a day, Disp: , Rfl:     Diclofenac Sodium  MG 24 hr tablet, TAKE 1 TABLET BY MOUTH  TWICE A DAY AS NEEDED FOR  PAIN, Disp: 180 tablet, Rfl: 0    fludrocortisone (FLORINEF) 0 1 mg tablet, Take 0 1 mg by mouth daily, Disp: , Rfl:     metoprolol tartrate (LOPRESSOR) 25 mg tablet, Take 25 mg by mouth every 12 (twelve) hours, Disp: , Rfl:     nitroglycerin (NITROSTAT) 0 4 mg SL tablet, Place 0 4 mg under the tongue every 5 (five) minutes as needed for chest pain, Disp: , Rfl:     pramipexole (MIRAPEX) 0 125 mg tablet, Take 0 125 mg by mouth 3 (three) times a day, Disp: , Rfl:     ranolazine (RANEXA) 500 mg 12 hr tablet, Take 500 mg by mouth 2 (two) times a day, Disp: , Rfl:     sertraline (ZOLOFT) 100 mg tablet, TAKE 1 TABLET BY MOUTH  DAILY, Disp: 90 tablet, Rfl: 3    sodium chloride 1 g tablet, Take 1 g by mouth 3 (three) times a day, Disp: , Rfl:     vancomycin (VANCOCIN) 125 MG capsule, Take 125 mg by mouth 4 (four) times a day, Disp: , Rfl:       Physical Exam:  /80   Ht 5' 11" (1 803 m)   Wt 75 5 kg (166 lb 6 4 oz)   BMI 23 21 kg/m²     Physical Exam   Constitutional: He is oriented to person, place, and time  He appears well-developed and well-nourished  No distress  HENT:   Head: Normocephalic and atraumatic  Eyes: Conjunctivae are normal    Cardiovascular: Normal rate, regular rhythm and normal heart sounds  Exam reveals no gallop and no friction rub  No murmur heard  Pulmonary/Chest: Effort normal and breath sounds normal  No respiratory distress  He has no wheezes  He has no rales  Musculoskeletal: He exhibits no edema  Neurological: He is alert and oriented to person, place, and time  Skin: He is not diaphoretic  Psychiatric: He has a normal mood and affect  His behavior is normal  Judgment and thought content normal    Vitals reviewed            Labs:  Lab Results   Component Value Date    WBC 11 81 (H) 06/06/2017    HGB 13 5 06/06/2017    HCT 39 9 06/06/2017    MCV 93 06/06/2017     06/06/2017     Lab Results   Component Value Date     03/10/2014    K 4 6 06/06/2017     06/06/2017    CO2 29 06/06/2017    ANIONGAP 13 03/10/2014    BUN 14 06/06/2017    CREATININE 0 72 06/06/2017    GLUCOSE 103 03/10/2014    GLUF 80 06/06/2017    CALCIUM 8 5 06/06/2017    AST 14 06/06/2017    ALT 26 06/06/2017    ALKPHOS 62 06/06/2017    EGFR >60 0 06/06/2017

## 2019-10-21 ENCOUNTER — TELEPHONE (OUTPATIENT)
Dept: FAMILY MEDICINE CLINIC | Facility: CLINIC | Age: 79
End: 2019-10-21

## 2019-10-21 DIAGNOSIS — R05.8 PRODUCTIVE COUGH: Primary | ICD-10-CM

## 2019-10-21 NOTE — TELEPHONE ENCOUNTER
CAN YOU ORDER A MOBILE X-RAY FOR PT  THEY WILL SET IT UP WITH THE MOBILE UNIT, JUST NEEDS RX  HE HAS SOME SOB, LABORED BREATHING, RONCHI IN THE LEFT LOWER LOBE, SPUTUM IS YELLOW/BROWN  FAX TO Jagdeep Marcos -[phone]  LET IKRILL KNOW IF WE DO THIS

## 2019-10-21 NOTE — TELEPHONE ENCOUNTER
Rx for x-ray printed  If patient getting more short of breath or having other symptoms, he needs to go to the ER

## 2019-10-23 ENCOUNTER — TELEPHONE (OUTPATIENT)
Dept: FAMILY MEDICINE CLINIC | Facility: CLINIC | Age: 79
End: 2019-10-23

## 2019-10-23 DIAGNOSIS — R06.2 WHEEZING: Primary | ICD-10-CM

## 2019-10-23 RX ORDER — ALBUTEROL SULFATE 2.5 MG/3ML
2.5 SOLUTION RESPIRATORY (INHALATION) EVERY 6 HOURS PRN
Qty: 60 VIAL | Refills: 5 | Status: SHIPPED | OUTPATIENT
Start: 2019-10-23

## 2019-10-23 NOTE — PROGRESS NOTES
CAN YOU ORDER ALBUTEROL 2 5 ML/3 ML FOR THE NEBULIZER TO TAKE 1 VIAL EVERY 4 HOURS PRN SOB/WHEEZING TO THE [facility]

## 2019-10-23 NOTE — TELEPHONE ENCOUNTER
PT HAD NEBULIZER SOLUTION FROM WHEN HE WAS IN REHAB  ALMOST OUT, WILL YOU FILL THE ALBUTEROL 2 5 ML/3 ML, ONE VIAL VIA NEB EVERY FOUR HOURS FOR SOB/WHEEZING  SEND TO [facility]

## 2019-10-26 DIAGNOSIS — M48.07 LUMBOSACRAL SPINAL STENOSIS: ICD-10-CM

## 2019-10-26 DIAGNOSIS — N39.3 URINARY INCONTINENCE, MALE, STRESS: Primary | ICD-10-CM

## 2019-10-27 RX ORDER — MIRABEGRON 50 MG/1
TABLET, FILM COATED, EXTENDED RELEASE ORAL
Qty: 30 TABLET | Refills: 5 | Status: SHIPPED | OUTPATIENT
Start: 2019-10-27 | End: 2020-02-10

## 2019-10-28 ENCOUNTER — TRANSITIONAL CARE MANAGEMENT (OUTPATIENT)
Dept: FAMILY MEDICINE CLINIC | Facility: CLINIC | Age: 79
End: 2019-10-28

## 2019-11-13 ENCOUNTER — TELEPHONE (OUTPATIENT)
Dept: FAMILY MEDICINE CLINIC | Facility: CLINIC | Age: 79
End: 2019-11-13

## 2019-11-13 DIAGNOSIS — F32.A DEPRESSION, UNSPECIFIED DEPRESSION TYPE: Primary | ICD-10-CM

## 2019-11-13 NOTE — TELEPHONE ENCOUNTER
WENT IN TO SEE PT TODAY AND DO THERAPY  BP WAS LOW AT 72/48  AFTER ACTIVITY IT WAS 75/50  HIS WIFE GAVE HIM THE MEDICATION YOU PRESCRIBED FOR THIS  SHE REVIEWED WITH THEM TO DO THAT DAILY FOR THE LOW BP  ALSO HE IS NOT DRINKING ENOUGH  HE DRINKS COFFEE DURING THE DAY AND ALCOHOL AT NIGHT, MAYBE DEHYDRATED  HE SEEMS DEPRESSED, WIFE STATED HE STAYED IN BED ALL DAY YESTERDAY  WAS ASKED IF HE WANTED TO SPEAK TO SOMEONE, HE DECLINED  SHE ENCOURAGED PT TO DRINK MORE AND GET UP TO DO MORE  ANY QUESTIONS FEEL FREE TO CALL HER

## 2019-11-14 RX ORDER — FUROSEMIDE 40 MG/1
TABLET ORAL
Refills: 0 | COMMUNITY
Start: 2019-10-25 | End: 2020-02-10

## 2019-11-14 RX ORDER — BUPROPION HYDROCHLORIDE 100 MG/1
100 TABLET, EXTENDED RELEASE ORAL EVERY MORNING
Qty: 30 TABLET | Refills: 3 | Status: SHIPPED | OUTPATIENT
Start: 2019-11-14 | End: 2020-02-10 | Stop reason: SDUPTHER

## 2019-11-14 NOTE — TELEPHONE ENCOUNTER
I will add 100 milligrams of Wellbutrin in the morning  Hopefully this will give him more energy    Call us if he has any problems with it

## 2019-11-15 ENCOUNTER — TELEPHONE (OUTPATIENT)
Dept: FAMILY MEDICINE CLINIC | Facility: CLINIC | Age: 79
End: 2019-11-15

## 2019-11-15 DIAGNOSIS — R06.00 DYSPNEA ON EXERTION: ICD-10-CM

## 2019-11-15 DIAGNOSIS — F32.A DEPRESSION, UNSPECIFIED DEPRESSION TYPE: ICD-10-CM

## 2019-11-15 DIAGNOSIS — A04.72 C. DIFFICILE COLITIS: Primary | ICD-10-CM

## 2019-11-15 DIAGNOSIS — D63.8 ANEMIA OF CHRONIC DISORDER: ICD-10-CM

## 2019-11-15 NOTE — TELEPHONE ENCOUNTER
Received a call from JOSE with [facility] this morning asking if you would like blood work drawn on patient since it has been reported that he wasn't eating or drinking properly?

## 2019-11-15 NOTE — TELEPHONE ENCOUNTER
Jamal Dodge from [facility] called to make you aware that when she arrived at the patient's home today he was very weak and almost fell down his stairs  Patient's wife took him to [facility] ER and will keep Jamal Dodge posted on his treatments

## 2019-11-19 ENCOUNTER — TRANSITIONAL CARE MANAGEMENT (OUTPATIENT)
Dept: FAMILY MEDICINE CLINIC | Facility: CLINIC | Age: 79
End: 2019-11-19

## 2019-11-25 ENCOUNTER — TELEPHONE (OUTPATIENT)
Dept: FAMILY MEDICINE CLINIC | Facility: CLINIC | Age: 79
End: 2019-11-25

## 2019-11-25 NOTE — TELEPHONE ENCOUNTER
Received a call from Tristian Russ with [facility] PT, he saw patient this morning for an Eval since his discharge last week  Wanted you to be aware that patient's wife reported to him that patient was walking down the stairs unassisted and when he went to reach for his walker at the bottom of the stairs he lost his balance and fell  Patient reports no pain from the fall

## 2019-12-03 ENCOUNTER — TELEPHONE (OUTPATIENT)
Dept: FAMILY MEDICINE CLINIC | Facility: CLINIC | Age: 79
End: 2019-12-03

## 2019-12-03 NOTE — TELEPHONE ENCOUNTER
Received a call from Schuyler Harvey with [facility], reports patient had a fall on Sunday at his home  Patient tripped on his shoelace  No visible bruising or injury reported  Patient was not alone when he had the fall and was checked on by a family member to make sure he was ok  Just wanted to make you aware

## 2019-12-16 ENCOUNTER — TELEPHONE (OUTPATIENT)
Dept: FAMILY MEDICINE CLINIC | Facility: CLINIC | Age: 79
End: 2019-12-16

## 2019-12-16 NOTE — TELEPHONE ENCOUNTER
Received a call from Silas Aylaa with [facility], when she arrived at patient's home for PT he was c/o all day fatigue  BP was 75/40, wife gave patient his medication that was prescribed for when it is low  All other vitals were stable

## 2019-12-20 ENCOUNTER — TRANSITIONAL CARE MANAGEMENT (OUTPATIENT)
Dept: FAMILY MEDICINE CLINIC | Facility: CLINIC | Age: 79
End: 2019-12-20

## 2020-02-06 ENCOUNTER — TRANSITIONAL CARE MANAGEMENT (OUTPATIENT)
Dept: FAMILY MEDICINE CLINIC | Facility: CLINIC | Age: 80
End: 2020-02-06

## 2020-02-10 ENCOUNTER — OFFICE VISIT (OUTPATIENT)
Dept: FAMILY MEDICINE CLINIC | Facility: CLINIC | Age: 80
End: 2020-02-10
Payer: MEDICARE

## 2020-02-10 VITALS
BODY MASS INDEX: 22.68 KG/M2 | DIASTOLIC BLOOD PRESSURE: 80 MMHG | WEIGHT: 162 LBS | SYSTOLIC BLOOD PRESSURE: 140 MMHG | HEIGHT: 71 IN

## 2020-02-10 DIAGNOSIS — E78.5 DYSLIPIDEMIA: ICD-10-CM

## 2020-02-10 DIAGNOSIS — R10.13 DYSPEPSIA: ICD-10-CM

## 2020-02-10 DIAGNOSIS — F32.A DEPRESSION, UNSPECIFIED DEPRESSION TYPE: ICD-10-CM

## 2020-02-10 DIAGNOSIS — I48.0 PAROXYSMAL ATRIAL FIBRILLATION (HCC): ICD-10-CM

## 2020-02-10 DIAGNOSIS — G20 PARKINSON'S DISEASE (HCC): ICD-10-CM

## 2020-02-10 DIAGNOSIS — G20 PARKINSON DISEASE (HCC): ICD-10-CM

## 2020-02-10 DIAGNOSIS — I10 ESSENTIAL HYPERTENSION: ICD-10-CM

## 2020-02-10 DIAGNOSIS — G91.2 NORMAL PRESSURE HYDROCEPHALUS (HCC): Primary | ICD-10-CM

## 2020-02-10 DIAGNOSIS — I10 ESSENTIAL HYPERTENSION: Primary | ICD-10-CM

## 2020-02-10 DIAGNOSIS — K31.1 GASTRIC OUTLET OBSTRUCTION: ICD-10-CM

## 2020-02-10 PROBLEM — J43.8 OTHER EMPHYSEMA (HCC): Status: ACTIVE | Noted: 2020-01-10

## 2020-02-10 PROBLEM — E83.42 HYPOMAGNESEMIA: Status: ACTIVE | Noted: 2020-01-19

## 2020-02-10 PROCEDURE — 99495 TRANSJ CARE MGMT MOD F2F 14D: CPT | Performed by: FAMILY MEDICINE

## 2020-02-10 RX ORDER — FERROUS SULFATE 325(65) MG
325 TABLET ORAL
COMMUNITY
End: 2020-02-10 | Stop reason: SDUPTHER

## 2020-02-10 RX ORDER — PANTOPRAZOLE SODIUM 40 MG/1
40 TABLET, DELAYED RELEASE ORAL
COMMUNITY
Start: 2020-01-30 | End: 2020-02-10 | Stop reason: SDUPTHER

## 2020-02-10 RX ORDER — ATORVASTATIN CALCIUM 20 MG/1
20 TABLET, FILM COATED ORAL DAILY
Qty: 90 TABLET | Refills: 3 | Status: SHIPPED | OUTPATIENT
Start: 2020-02-10 | End: 2021-01-27 | Stop reason: SDUPTHER

## 2020-02-10 RX ORDER — FERROUS SULFATE 325(65) MG
325 TABLET ORAL
Qty: 90 TABLET | Refills: 3 | Status: SHIPPED | OUTPATIENT
Start: 2020-02-10

## 2020-02-10 RX ORDER — MIDODRINE HYDROCHLORIDE 2.5 MG/1
7.5 TABLET ORAL DAILY
Qty: 270 TABLET | Refills: 3 | Status: SHIPPED | OUTPATIENT
Start: 2020-02-10 | End: 2020-06-17 | Stop reason: ALTCHOICE

## 2020-02-10 RX ORDER — BUPROPION HYDROCHLORIDE 100 MG/1
100 TABLET, EXTENDED RELEASE ORAL EVERY MORNING
Qty: 90 TABLET | Refills: 3 | Status: SHIPPED | OUTPATIENT
Start: 2020-02-10 | End: 2021-01-27 | Stop reason: SDUPTHER

## 2020-02-10 RX ORDER — PANTOPRAZOLE SODIUM 40 MG/1
40 TABLET, DELAYED RELEASE ORAL DAILY
Qty: 90 TABLET | Refills: 3 | Status: SHIPPED | OUTPATIENT
Start: 2020-02-10 | End: 2021-01-27 | Stop reason: SDUPTHER

## 2020-02-10 RX ORDER — MIDODRINE HYDROCHLORIDE 2.5 MG/1
TABLET ORAL
COMMUNITY
Start: 2020-01-30 | End: 2020-02-10 | Stop reason: SDUPTHER

## 2020-02-10 RX ORDER — ATORVASTATIN CALCIUM 20 MG/1
TABLET, FILM COATED ORAL
COMMUNITY
Start: 2020-01-30 | End: 2020-02-10 | Stop reason: SDUPTHER

## 2020-02-10 RX ORDER — FLUDROCORTISONE ACETATE 0.1 MG/1
0.1 TABLET ORAL DAILY
Qty: 90 TABLET | Refills: 3 | Status: SHIPPED | OUTPATIENT
Start: 2020-02-10 | End: 2020-06-17 | Stop reason: ALTCHOICE

## 2020-02-10 RX ORDER — PANTOPRAZOLE SODIUM 40 MG/1
TABLET, DELAYED RELEASE ORAL
COMMUNITY
Start: 2020-01-30 | End: 2020-02-10 | Stop reason: SDUPTHER

## 2020-02-10 RX ORDER — SERTRALINE HYDROCHLORIDE 100 MG/1
100 TABLET, FILM COATED ORAL DAILY
Qty: 90 TABLET | Refills: 3 | Status: SHIPPED | OUTPATIENT
Start: 2020-02-10 | End: 2021-01-27 | Stop reason: SDUPTHER

## 2020-02-10 RX ORDER — MIDODRINE HYDROCHLORIDE 2.5 MG/1
7.5 TABLET ORAL
COMMUNITY
Start: 2020-01-30 | End: 2020-02-10 | Stop reason: SDUPTHER

## 2020-02-10 NOTE — PROGRESS NOTES
Transition of Care  Follow-up After Hospitalization    Anyi Rowley 79 y o  male   Date:  2/10/2020    TCM Call (since 1/10/2020)     Date and time call was made  2/6/2020 12:02 PM    Hospital care reviewed  Records reviewed    Patient was hospitialized at  Other (comment)    [address] [place] [address], [facility]    Date of Admission  12/24/19    Date of discharge  01/31/20    Diagnosis  Gastric Outlet Obstruction/ Parkinsons    Disposition  Home    Current Symptoms  None      TCM Call (since 1/10/2020)     Post hospital issues  None    Should patient be enrolled in anticoag monitoring? No    Scheduled for follow up? Yes <img src='C:FILES (X86)    Patients specialists  Other (comment)    Other specialists names  Gastro: Dr Mahamed Morataya    Referrals needed  Nerosurgeon and Cardiologist    Did you obtain your prescribed medications  Yes    Do you need help managing your prescriptions or medications  No    Is transportation to your appointment needed  No    I have advised the patient to call PCP with any new or worsening symptoms  Ashanti Aguilera, [address] [place] [address] or [facility] other    Support System  Spouse    The type of support provided  Emotional; Financial; Physical    Do you have social support  Yes, as much as I need    Are you recieving any outpatient services  No    Are you recieving home care services  Yes    Types of home care services  Home health aid; Home PT    Are you using any community resources  No <img src='C:FILES (X86)    Current waiver services  No    Have you fallen in the last 12 months  Yes    How many times  Many time, had brain surgery, hoping will help  Also falls from dehydration    Interperter language line needed  No    Counseling  Family <img src='C:FILES (X86)    Comments  walker and wheel chair        Hospital records were reviewed  Medications upon discharge reviewed/updated     Discharge Disposition: Follow up visits with other specialists:       Assessment and Plan:  Blood work that he had done last week shows that his creatinine is stable at 0 9 and his hemoglobin was 10 1  Follow-up with Hematology, surgeon, neuro surgery for his NPH and Cardiology for his paroxysmal Afib  Follow-up here in 3-4 months or p rika n  Estrellita Flores was seen today for follow-up  Diagnoses and all orders for this visit:    Normal pressure hydrocephalus (HCC)    Parkinson's disease ([address] )    Gastric outlet obstruction    Essential hypertension    Paroxysmal atrial fibrillation (HCC)            HPI:  DOUGLAS  Patient was discharged from rehab on 01/31/2020  Patient was admitted into the hospital just before [place]  Found to have a gastric outlet blockage due to ulcers  Patient had surgery to remove the blockage  Currently doing well  Tolerating diet  Denies any chest pain or shortness of breath  Patient has home health, PT and OT coming in  Medications reviewed      ROS: Review of Systems   Constitutional: Negative  Respiratory: Negative  Cardiovascular: Negative  Gastrointestinal:        As per HPI   Genitourinary: Negative  History reviewed  No pertinent past medical history      Past Surgical History:   Procedure Laterality Date    CARDIAC SURGERY      COLONOSCOPY WITH FECAL TRANSPLANT      SHUNT TAP  08/25/2019       Social History     Socioeconomic History    Marital status: /Civil Union     Spouse name: None    Number of children: None    Years of education: None    Highest education level: None   Occupational History    None   Social Needs    Financial resource strain: None    Food insecurity:     Worry: None     Inability: None    Transportation needs:     Medical: None     Non-medical: None   Tobacco Use    Smoking status: Never Smoker    Smokeless tobacco: Never Used   Substance and Sexual Activity    Alcohol use: Not Currently    Drug use: Never    Sexual activity: Not Currently Lifestyle    Physical activity:     Days per week: None     Minutes per session: None    Stress: None   Relationships    Social connections:     Talks on phone: None     Gets together: None     Attends [employer]an service: None     Active member of club or organization: None     Attends meetings of clubs or organizations: None     Relationship status: None    Intimate partner violence:     Fear of current or ex partner: None     Emotionally abused: None     Physically abused: None     Forced sexual activity: None   Other Topics Concern    None   Social History Narrative    None       Family History   Family history unknown: Yes       No Known Allergies      Current Outpatient Medications:     albuterol (2 5 mg/3 mL) 0 083 % nebulizer solution, Take 1 vial (2 5 mg total) by nebulization every 6 (six) hours as needed for wheezing or shortness of breath, Disp: 60 vial, Rfl: 5    aspirin (ECOTRIN LOW STRENGTH) 81 mg EC tablet, Take 81 mg by mouth daily, Disp: , Rfl:     nitroglycerin (NITROSTAT) 0 4 mg SL tablet, Place 0 4 mg under the tongue every 5 (five) minutes as needed for chest pain, Disp: , Rfl:     atorvastatin (LIPITOR) 20 mg tablet, Take 1 tablet (20 mg total) by mouth daily, Disp: 90 tablet, Rfl: 3    buPROPion (WELLBUTRIN SR) 100 mg 12 hr tablet, Take 1 tablet (100 mg total) by mouth every morning, Disp: 90 tablet, Rfl: 3    carbidopa-levodopa (SINEMET)  mg per tablet, Take 1 tablet by mouth 2 (two) times a day, Disp: 180 tablet, Rfl: 3    cyanocobalamin (VITAMIN B-12) 1000 MCG tablet, Take 1 tablet (1,000 mcg total) by mouth daily, Disp: 90 tablet, Rfl: 3    ferrous sulfate 325 (65 Fe) mg tablet, Take 1 tablet (325 mg total) by mouth daily with breakfast, Disp: 90 tablet, Rfl: 3    fludrocortisone (FLORINEF) 0 1 mg tablet, Take 1 tablet (0 1 mg total) by mouth daily, Disp: 90 tablet, Rfl: 3    magnesium oxide (MAG-OX) 400 mg, Take 1 tablet (400 mg total) by mouth 2 (two) times a day, Disp: 180 tablet, Rfl: 3    midodrine (PROAMATINE) 2 5 mg tablet, Take 3 tablets (7 5 mg total) by mouth daily, Disp: 270 tablet, Rfl: 3    pantoprazole (PROTONIX) 40 mg tablet, Take 1 tablet (40 mg total) by mouth daily, Disp: 90 tablet, Rfl: 3    sertraline (ZOLOFT) 100 mg tablet, Take 1 tablet (100 mg total) by mouth daily, Disp: 90 tablet, Rfl: 3      Physical Exam:  /80   Ht 5' 11" (1 803 m)   Wt 73 5 kg (162 lb)   BMI 22 59 kg/m²     Physical Exam   Constitutional: He is oriented to person, place, and time  He appears well-developed and well-nourished  No distress  HENT:   Head: Normocephalic and atraumatic  Eyes: Conjunctivae are normal    Cardiovascular: Normal rate, regular rhythm and normal heart sounds  Exam reveals no gallop and no friction rub  No murmur heard  Pulmonary/Chest: Effort normal and breath sounds normal  No respiratory distress  He has no wheezes  He has no rales  Musculoskeletal: He exhibits no edema  Neurological: He is alert and oriented to person, place, and time  Skin: He is not diaphoretic  Psychiatric: He has a normal mood and affect  His behavior is normal  Judgment and thought content normal    Vitals reviewed            Labs:  Lab Results   Component Value Date    WBC 11 81 (H) 06/06/2017    HGB 13 5 06/06/2017    HCT 39 9 06/06/2017    MCV 93 06/06/2017     06/06/2017     Lab Results   Component Value Date     03/10/2014    K 4 6 06/06/2017     06/06/2017    CO2 29 06/06/2017    ANIONGAP 13 03/10/2014    BUN 14 06/06/2017    CREATININE 0 72 06/06/2017    GLUCOSE 103 03/10/2014    GLUF 80 06/06/2017    CALCIUM 8 5 06/06/2017    AST 14 06/06/2017    ALT 26 06/06/2017    ALKPHOS 62 06/06/2017    EGFR >60 0 06/06/2017

## 2020-03-06 ENCOUNTER — TELEPHONE (OUTPATIENT)
Dept: FAMILY MEDICINE CLINIC | Facility: CLINIC | Age: 80
End: 2020-03-06

## 2020-03-06 DIAGNOSIS — G91.2 NORMAL PRESSURE HYDROCEPHALUS (HCC): ICD-10-CM

## 2020-03-06 DIAGNOSIS — G20 PARKINSON'S DISEASE (HCC): Primary | ICD-10-CM

## 2020-03-06 DIAGNOSIS — M19.90 ARTHRITIS: ICD-10-CM

## 2020-03-06 NOTE — TELEPHONE ENCOUNTER
Requesting a script to transition to out patient PT    Script needs to be faxed to [facility]    Fax [phone]

## 2020-06-17 ENCOUNTER — OFFICE VISIT (OUTPATIENT)
Dept: FAMILY MEDICINE CLINIC | Facility: CLINIC | Age: 80
End: 2020-06-17
Payer: MEDICARE

## 2020-06-17 VITALS
TEMPERATURE: 98.2 F | BODY MASS INDEX: 22.12 KG/M2 | HEIGHT: 71 IN | DIASTOLIC BLOOD PRESSURE: 84 MMHG | WEIGHT: 158 LBS | SYSTOLIC BLOOD PRESSURE: 130 MMHG

## 2020-06-17 DIAGNOSIS — Z00.00 MEDICARE ANNUAL WELLNESS VISIT, SUBSEQUENT: Primary | ICD-10-CM

## 2020-06-17 DIAGNOSIS — N64.4 NIPPLE TENDERNESS: ICD-10-CM

## 2020-06-17 PROCEDURE — 3008F BODY MASS INDEX DOCD: CPT | Performed by: FAMILY MEDICINE

## 2020-06-17 PROCEDURE — 1125F AMNT PAIN NOTED PAIN PRSNT: CPT | Performed by: FAMILY MEDICINE

## 2020-06-17 PROCEDURE — 3079F DIAST BP 80-89 MM HG: CPT | Performed by: FAMILY MEDICINE

## 2020-06-17 PROCEDURE — 3075F SYST BP GE 130 - 139MM HG: CPT | Performed by: FAMILY MEDICINE

## 2020-06-17 PROCEDURE — 1160F RVW MEDS BY RX/DR IN RCRD: CPT | Performed by: FAMILY MEDICINE

## 2020-06-17 PROCEDURE — 1036F TOBACCO NON-USER: CPT | Performed by: FAMILY MEDICINE

## 2020-06-17 PROCEDURE — G0438 PPPS, INITIAL VISIT: HCPCS | Performed by: FAMILY MEDICINE

## 2020-06-17 PROCEDURE — 4040F PNEUMOC VAC/ADMIN/RCVD: CPT | Performed by: FAMILY MEDICINE

## 2020-06-17 PROCEDURE — 1170F FXNL STATUS ASSESSED: CPT | Performed by: FAMILY MEDICINE

## 2020-06-17 RX ORDER — OXYCODONE HCL 20 MG/1
TABLET, FILM COATED, EXTENDED RELEASE ORAL
COMMUNITY

## 2020-06-17 RX ORDER — OXYCODONE HYDROCHLORIDE AND ACETAMINOPHEN 5; 325 MG/1; MG/1
TABLET ORAL
COMMUNITY

## 2021-01-27 DIAGNOSIS — R10.13 DYSPEPSIA: ICD-10-CM

## 2021-01-27 DIAGNOSIS — G20 PARKINSON DISEASE (HCC): ICD-10-CM

## 2021-01-27 DIAGNOSIS — E78.5 DYSLIPIDEMIA: ICD-10-CM

## 2021-01-27 DIAGNOSIS — F32.A DEPRESSION, UNSPECIFIED DEPRESSION TYPE: ICD-10-CM

## 2021-01-27 RX ORDER — SERTRALINE HYDROCHLORIDE 100 MG/1
TABLET, FILM COATED ORAL
Qty: 90 TABLET | Refills: 3 | Status: SHIPPED | OUTPATIENT
Start: 2021-01-27

## 2021-01-27 RX ORDER — ATORVASTATIN CALCIUM 20 MG/1
TABLET, FILM COATED ORAL
Qty: 90 TABLET | Refills: 3 | Status: SHIPPED | OUTPATIENT
Start: 2021-01-27

## 2021-01-27 RX ORDER — PANTOPRAZOLE SODIUM 40 MG/1
TABLET, DELAYED RELEASE ORAL
Qty: 90 TABLET | Refills: 3 | Status: SHIPPED | OUTPATIENT
Start: 2021-01-27

## 2021-01-27 RX ORDER — BUPROPION HYDROCHLORIDE 100 MG/1
TABLET, EXTENDED RELEASE ORAL
Qty: 90 TABLET | Refills: 3 | Status: SHIPPED | OUTPATIENT
Start: 2021-01-27

## 2021-02-04 ENCOUNTER — TELEPHONE (OUTPATIENT)
Dept: FAMILY MEDICINE CLINIC | Facility: CLINIC | Age: 81
End: 2021-02-04

## 2021-02-04 NOTE — TELEPHONE ENCOUNTER
PT IS COMING HOME, THEY ARE GOING TO START SERVICES WITH HIM  OT, PT, SKILLED, AND HOME HEALTH AIDE WILL BE GOING IN  SHE WILL FAX ORDERS FOR YOU TO SIGN

## 2021-02-09 ENCOUNTER — TELEPHONE (OUTPATIENT)
Dept: FAMILY MEDICINE CLINIC | Facility: CLINIC | Age: 81
End: 2021-02-09

## 2021-02-09 NOTE — TELEPHONE ENCOUNTER
WAS IN TO SEE PATIENT TODAY, HE IS NOT HAVING PAIN BUT NOT FOLLOWING PROCEDURE  HE IS NOT WEARING HIS ASPEN COLLAR, HE HAS APPT WITH [facility] TOMORROW FOR THAT  HE IS TO BE NON WEIGHT BEARING ON THE LEFT LOWER EXTREMITY AND HE IS PUTTING HIS FULL WEIGHT ON IT  HAS APPT WITH ORTHO 3-19-21

## 2021-02-09 NOTE — TELEPHONE ENCOUNTER
Please call patient and encourage him to wear his Aspen collar and not to weight bear on his left leg